# Patient Record
Sex: FEMALE | Race: WHITE
[De-identification: names, ages, dates, MRNs, and addresses within clinical notes are randomized per-mention and may not be internally consistent; named-entity substitution may affect disease eponyms.]

---

## 2020-01-09 ENCOUNTER — HOSPITAL ENCOUNTER (OUTPATIENT)
Dept: HOSPITAL 74 - JASU-SURG | Age: 35
Discharge: HOME | End: 2020-01-09
Attending: OBSTETRICS & GYNECOLOGY
Payer: COMMERCIAL

## 2020-01-09 VITALS — SYSTOLIC BLOOD PRESSURE: 102 MMHG | TEMPERATURE: 98.6 F | DIASTOLIC BLOOD PRESSURE: 67 MMHG

## 2020-01-09 VITALS — HEART RATE: 70 BPM

## 2020-01-09 VITALS — BODY MASS INDEX: 29.9 KG/M2

## 2020-01-09 DIAGNOSIS — O02.1: Primary | ICD-10-CM

## 2020-01-09 LAB
ALBUMIN SERPL-MCNC: 3.9 G/DL (ref 3.4–5)
ALP SERPL-CCNC: 47 U/L (ref 45–117)
ALT SERPL-CCNC: 31 U/L (ref 13–61)
ANION GAP SERPL CALC-SCNC: 8 MMOL/L (ref 8–16)
APPEARANCE UR: (no result)
AST SERPL-CCNC: 14 U/L (ref 15–37)
BACTERIA # UR AUTO: 38.7 /HPF
BASOPHILS # BLD: 0.6 % (ref 0–2)
BILIRUB SERPL-MCNC: 0.4 MG/DL (ref 0.2–1)
BILIRUB UR STRIP.AUTO-MCNC: NEGATIVE MG/DL
BUN SERPL-MCNC: 13.1 MG/DL (ref 7–18)
CALCIUM SERPL-MCNC: 8.8 MG/DL (ref 8.5–10.1)
CASTS URNS QL MICRO: 20 /LPF (ref 0–8)
CHLORIDE SERPL-SCNC: 107 MMOL/L (ref 98–107)
CO2 SERPL-SCNC: 25 MMOL/L (ref 21–32)
COLOR UR: YELLOW
CREAT SERPL-MCNC: 0.7 MG/DL (ref 0.55–1.3)
DEPRECATED RDW RBC AUTO: 12.7 % (ref 11.6–15.6)
EOSINOPHIL # BLD: 3.9 % (ref 0–4.5)
EPITH CASTS URNS QL MICRO: 6.8 /HPF
GLUCOSE SERPL-MCNC: 89 MG/DL (ref 74–106)
HCT VFR BLD CALC: 38 % (ref 32.4–45.2)
HGB BLD-MCNC: 13.2 GM/DL (ref 10.7–15.3)
KETONES UR QL STRIP: NEGATIVE
LEUKOCYTE ESTERASE UR QL STRIP.AUTO: (no result)
LYMPHOCYTES # BLD: 35.2 % (ref 8–40)
MCH RBC QN AUTO: 30.4 PG (ref 25.7–33.7)
MCHC RBC AUTO-ENTMCNC: 34.8 G/DL (ref 32–36)
MCV RBC: 87.3 FL (ref 80–96)
MONOCYTES # BLD AUTO: 9.8 % (ref 3.8–10.2)
NEUTROPHILS # BLD: 50.5 % (ref 42.8–82.8)
NITRITE UR QL STRIP: NEGATIVE
PH UR: 5 [PH] (ref 5–8)
PLATELET # BLD AUTO: 489 K/MM3 (ref 134–434)
PMV BLD: 7.6 FL (ref 7.5–11.1)
POTASSIUM SERPLBLD-SCNC: 4 MMOL/L (ref 3.5–5.1)
PROT SERPL-MCNC: 7.6 G/DL (ref 6.4–8.2)
PROT UR QL STRIP: NEGATIVE
PROT UR QL STRIP: NEGATIVE
RBC # BLD AUTO: 4 /HPF (ref 0–4)
RBC # BLD AUTO: 4.35 M/MM3 (ref 3.6–5.2)
SODIUM SERPL-SCNC: 140 MMOL/L (ref 136–145)
SP GR UR: 1.03 (ref 1.01–1.03)
UROBILINOGEN UR STRIP-MCNC: 0.2 MG/DL (ref 0.2–1)
WBC # BLD AUTO: 9.1 K/MM3 (ref 4–10)
WBC # UR AUTO: 36 /HPF (ref 0–5)

## 2020-01-09 PROCEDURE — 10D17ZZ EXTRACTION OF PRODUCTS OF CONCEPTION, RETAINED, VIA NATURAL OR ARTIFICIAL OPENING: ICD-10-PCS | Performed by: OBSTETRICS & GYNECOLOGY

## 2020-01-09 NOTE — OP
Operative Note





- Note:


Operative Date: 20


Pre-Operative Diagnosis: Missed 


Operation: Suction DC


Post-Operative Diagnosis: Same as Pre-op


Anesthesia: General


Estimated Blood Loss (mls): 30


Operative Report Dictated: Yes

## 2020-01-09 NOTE — HP
Satellite [facility]





- Chief Complaint


Chief Complaint: Missed 


History of Present Illness: 32 yo G P with usg showng missed  for DC


History Source: Patient


Limitations to Obtaining History: No Limitations





- Past Medical History


Allergies/Adverse Reactions: 


 Allergies











Allergy/AdvReac Type Severity Reaction Status Date / Time


 


No Known Allergies Allergy   Verified 20 08:51











...LMP: 10/18/19





- Current Medications


Current Medications: 


 Home Medications











 Medication  Instructions  Recorded


 


Amoxicillin - [Amoxicillin 500mg 500 mg PO BID 20





Capsule -]  














Satellite Physical Exam





- Physical Examination


Vital Signs: 


 Vital Signs











 Period  Temp  Pulse  Resp  BP Sys/Ramirez  Pulse Ox


 


 Last 24 Hr    96  16  124/78  99











General Appearance: Well Nourished, Well Developed


Lung: Clear to auscultation


Heart: Regular rate & rhythm


Breasts: Soft, Non-Tender


Abdomen: Soft


Extremities: No edema


Pelvic Exam: Within normal limits External Genitalia, Within normal limits 

Vagina, Within normal limits Cervix, Within normal limits [place], Other Uterus 

(enlarged)


Neurological: Intact, Alert, Oriented





Satellite Impression/Plan





- Impression/Plan


Impression: Missed 


Operative Procedure: Suction DC


Date to be Performed: 20

## 2020-01-09 NOTE — EKG
Test Reason : 

Blood Pressure : ***/*** mmHG

Vent. Rate : 084 BPM     Atrial Rate : 084 BPM

   P-R Int : 158 ms          QRS Dur : 070 ms

    QT Int : 342 ms       P-R-T Axes : 023 018 038 degrees

   QTc Int : 404 ms

 

NORMAL SINUS RHYTHM

NORMAL ECG

NO PREVIOUS ECGS AVAILABLE

Confirmed by ERNESTINA HARDY, GRACY ([id number]) on 1/9/2020 11:47:36 AM

 

Referred By: Radha Anne           Confirmed By:GRACY DO MD

## 2020-01-10 NOTE — PATH
Surgical Pathology Report



Patient Name:  RICKEY DEAN

Accession #:  

Med. Rec. #:  [id number]                                                        

   /Age/Gender:  1985 (Age: 34) / F

Account:  [id number]                                                          

             Location: [facility] SURGICAL

Taken:  2020

Received:  2020

Reported:  1/10/2020

Physicians:  Radha Anne M.D.

  



Specimen(s) Received

 PRODUCTS OF CONCEPTION FOR CHROMOSOMAL STUDY 





Clinical History

Missed 







Final Diagnosis

PRODUCTS OF CONCEPTION:

CHORIONIC VILLI PRESENT, CONSISTENT WITH PRODUCTS OF CONCEPTION.

CHROMOSOME ANALYSIS RESULT PENDING. AN ADDENDUM REPORT TO FOLLOW. 





***Electronically Signed***

Darnell Enriquez M.D.





Gross Description

Received fresh labeled "products of conception," is a 7.0 x 4.0 x 0.8 cm

aggregate of tan-red soft tissue fragments. Villous tissue is identified. No

definitive fetal somatic tissue is identified. A representative portion is

placed in RPMI solution and sent for chromosomal analysis. An additional

representative portion is submitted in one cassette.

/2020



saudi/[email or web address]

## 2020-01-16 NOTE — OP
DATE OF OPERATION:  2020

 

PREOPERATIVE DIAGNOSIS:  Missed .

 

OPERATION:  Suction dilation and curettage.

 

POSTOPERATIVE DIAGNOSIS:  Missed .

 

SURGEON:  Radha Anne MD

 

ESTIMATED BLOOD LOSS:  30 mL.

 

PROCEDURE:  Patient was taken to the operating room, placed in the dorsal lithotomy

position, prepped and draped in the usual sterile fashion.  Timeout was performed in

accordance with hospital regulation.  Speculum was placed in the vagina.  Anterior

lip of the cervix grasped with single-tooth tenaculum.  Cervix was then dilated to

accommodate the No. 8 suction curet.  Suction curettage was then performed followed

by sharp curettage.  All contents were emptied and submitted to Pathology for

chromosomal analysis.  All instruments were then removed.  Patient tolerated

procedure well.  Estimated blood loss was 30 mL.

 

 

RADHA ANNE M.D.

 

[id number]

DD: 2020 06:49

DT: 2020 11:20

Job #:  [id number]

## 2021-05-25 ENCOUNTER — HOSPITAL ENCOUNTER (INPATIENT)
Dept: HOSPITAL 74 - JDEL | Age: 36
LOS: 5 days | Discharge: HOME | DRG: 540 | End: 2021-05-30
Attending: OBSTETRICS & GYNECOLOGY | Admitting: OBSTETRICS & GYNECOLOGY
Payer: COMMERCIAL

## 2021-05-25 VITALS — BODY MASS INDEX: 35.9 KG/M2

## 2021-05-25 DIAGNOSIS — Z3A.38: ICD-10-CM

## 2021-05-25 DIAGNOSIS — O32.4XX0: ICD-10-CM

## 2021-05-25 DIAGNOSIS — O13.3: Primary | ICD-10-CM

## 2021-05-25 DIAGNOSIS — O36.63X0: ICD-10-CM

## 2021-05-25 DIAGNOSIS — E66.9: ICD-10-CM

## 2021-05-25 LAB
ALT SERPL-CCNC: 20 U/L (ref 13–61)
ANION GAP SERPL CALC-SCNC: 14 MMOL/L (ref 8–16)
APPEARANCE UR: (no result)
APTT BLD: 24.5 SECONDS (ref 25.2–36.5)
AST SERPL-CCNC: 15 U/L (ref 15–37)
BACTERIA # UR AUTO: 327 /UL (ref 0–1359)
BASOPHILS # BLD: 0.4 % (ref 0–2)
BILIRUB UR STRIP.AUTO-MCNC: NEGATIVE MG/DL
BUN SERPL-MCNC: 8.3 MG/DL (ref 7–18)
CALCIUM SERPL-MCNC: 8.9 MG/DL (ref 8.5–10.1)
CASTS URNS QL MICRO: 5 /UL (ref 0–3.1)
CHLORIDE SERPL-SCNC: 104 MMOL/L (ref 98–107)
CO2 SERPL-SCNC: 20 MMOL/L (ref 21–32)
COLOR UR: YELLOW
CREAT SERPL-MCNC: 0.5 MG/DL (ref 0.55–1.3)
DEPRECATED RDW RBC AUTO: 13.8 % (ref 11.6–15.6)
EOSINOPHIL # BLD: 1.1 % (ref 0–4.5)
EPITH CASTS URNS QL MICRO: >36 /UL (ref 0–25.1)
GGT SERPL-CCNC: 7 U/L (ref 5–85)
GLUCOSE SERPL-MCNC: 78 MG/DL (ref 74–106)
HCT VFR BLD CALC: 35 % (ref 32.4–45.2)
HGB BLD-MCNC: 12 GM/DL (ref 10.7–15.3)
INR BLD: 0.91 (ref 0.83–1.09)
KETONES UR QL STRIP: NEGATIVE
LEUKOCYTE ESTERASE UR QL STRIP.AUTO: (no result)
LYMPHOCYTES # BLD: 19.7 % (ref 8–40)
MCH RBC QN AUTO: 30.4 PG (ref 25.7–33.7)
MCHC RBC AUTO-ENTMCNC: 34.2 G/DL (ref 32–36)
MCV RBC: 88.7 FL (ref 80–96)
MONOCYTES # BLD AUTO: 8.5 % (ref 3.8–10.2)
NEUTROPHILS # BLD: 70.3 % (ref 42.8–82.8)
NITRITE UR QL STRIP: NEGATIVE
PH UR: 6.5 [PH] (ref 5–8)
PLATELET # BLD AUTO: 379 K/MM3 (ref 134–434)
PLATELET # BLD AUTO: 389 K/MM3 (ref 134–434)
PMV BLD: 8.6 FL (ref 7.5–11.1)
PROT UR QL STRIP: (no result)
PROT UR QL STRIP: NEGATIVE
PT PNL PPP: 11 SEC (ref 9.7–13)
RBC # BLD AUTO: 12 /UL (ref 0–23.9)
RBC # BLD AUTO: 3.95 M/MM3 (ref 3.6–5.2)
RETICS # AUTO: 2.61 % (ref 0.5–1.5)
SODIUM SERPL-SCNC: 137 MMOL/L (ref 136–145)
SP GR UR: 1.02 (ref 1.01–1.03)
URATE SERPL-SCNC: 4.2 MG/DL (ref 2.6–7.2)
UROBILINOGEN UR STRIP-MCNC: 0.2 MG/DL (ref 0.2–1)
WBC # BLD AUTO: 13.2 K/MM3 (ref 4–10)
WBC # UR AUTO: 51 /UL (ref 0–25.8)

## 2021-05-25 PROCEDURE — 3E0P7VZ INTRODUCTION OF HORMONE INTO FEMALE REPRODUCTIVE, VIA NATURAL OR ARTIFICIAL OPENING: ICD-10-PCS | Performed by: OBSTETRICS & GYNECOLOGY

## 2021-05-25 PROCEDURE — U0003 INFECTIOUS AGENT DETECTION BY NUCLEIC ACID (DNA OR RNA); SEVERE ACUTE RESPIRATORY SYNDROME CORONAVIRUS 2 (SARS-COV-2) (CORONAVIRUS DISEASE [COVID-19]), AMPLIFIED PROBE TECHNIQUE, MAKING USE OF HIGH THROUGHPUT TECHNOLOGIES AS DESCRIBED BY CMS-2020-01-R: HCPCS

## 2021-05-25 PROCEDURE — U0005 INFEC AGEN DETEC AMPLI PROBE: HCPCS

## 2021-05-25 PROCEDURE — C9803 HOPD COVID-19 SPEC COLLECT: HCPCS

## 2021-05-25 RX ADMIN — LABETALOL HYDROCHLORIDE SCH MG: 200 TABLET, FILM COATED ORAL at 14:00

## 2021-05-25 RX ADMIN — LABETALOL HYDROCHLORIDE SCH MG: 200 TABLET, FILM COATED ORAL at 22:00

## 2021-05-26 LAB
BASE EXCESS BLDCOA CALC-SCNC: -4.9 MMOL/L (ref 0–2)
HCO3 BLDCO-SCNC: 22.2 MMHG (ref 20–29)
PCO2 BLDCO: 47.6 MMHG (ref 30–78)
PH BLDCO: 7.29 [PH] (ref 7.14–7.44)

## 2021-05-26 PROCEDURE — 3E033VJ INTRODUCTION OF OTHER HORMONE INTO PERIPHERAL VEIN, PERCUTANEOUS APPROACH: ICD-10-PCS | Performed by: OBSTETRICS & GYNECOLOGY

## 2021-05-26 RX ADMIN — LABETALOL HYDROCHLORIDE SCH MG: 200 TABLET, FILM COATED ORAL at 21:52

## 2021-05-26 RX ADMIN — LABETALOL HYDROCHLORIDE SCH: 200 TABLET, FILM COATED ORAL at 14:44

## 2021-05-26 RX ADMIN — LABETALOL HYDROCHLORIDE SCH MG: 200 TABLET, FILM COATED ORAL at 06:00

## 2021-05-26 RX ADMIN — BUPIVACAINE HYDROCHLORIDE SCH: 7.5 INJECTION, SOLUTION EPIDURAL; RETROBULBAR at 17:41

## 2021-05-26 RX ADMIN — BUPIVACAINE HYDROCHLORIDE SCH: 7.5 INJECTION, SOLUTION EPIDURAL; RETROBULBAR at 12:37

## 2021-05-27 LAB
DEPRECATED RDW RBC AUTO: 14.2 % (ref 11.6–15.6)
HCT VFR BLD CALC: 29 % (ref 32.4–45.2)
HGB BLD-MCNC: 10 GM/DL (ref 10.7–15.3)
MCH RBC QN AUTO: 30.7 PG (ref 25.7–33.7)
MCHC RBC AUTO-ENTMCNC: 34.5 G/DL (ref 32–36)
MCV RBC: 88.8 FL (ref 80–96)
PLATELET # BLD AUTO: 299 K/MM3 (ref 134–434)
PMV BLD: 8.3 FL (ref 7.5–11.1)
RBC # BLD AUTO: 3.27 M/MM3 (ref 3.6–5.2)
WBC # BLD AUTO: 16.5 K/MM3 (ref 4–10)

## 2021-05-27 RX ADMIN — LABETALOL HYDROCHLORIDE SCH MG: 200 TABLET, FILM COATED ORAL at 23:00

## 2021-05-27 RX ADMIN — IBUPROFEN PRN MG: 800 INJECTION INTRAVENOUS at 17:30

## 2021-05-27 RX ADMIN — SIMETHICONE CHEW TAB 80 MG PRN MG: 80 TABLET ORAL at 23:01

## 2021-05-27 RX ADMIN — LABETALOL HYDROCHLORIDE SCH MG: 200 TABLET, FILM COATED ORAL at 06:03

## 2021-05-27 RX ADMIN — LABETALOL HYDROCHLORIDE SCH MG: 200 TABLET, FILM COATED ORAL at 15:01

## 2021-05-27 RX ADMIN — IBUPROFEN PRN MG: 800 INJECTION INTRAVENOUS at 06:03

## 2021-05-27 RX ADMIN — ACETAMINOPHEN PRN MG: 325 TABLET ORAL at 23:00

## 2021-05-28 RX ADMIN — LABETALOL HYDROCHLORIDE SCH MG: 200 TABLET, FILM COATED ORAL at 22:26

## 2021-05-28 RX ADMIN — IBUPROFEN PRN MG: 600 TABLET, FILM COATED ORAL at 22:26

## 2021-05-28 RX ADMIN — SIMETHICONE CHEW TAB 80 MG PRN MG: 80 TABLET ORAL at 06:05

## 2021-05-28 RX ADMIN — IBUPROFEN PRN MG: 600 TABLET, FILM COATED ORAL at 06:04

## 2021-05-28 RX ADMIN — ACETAMINOPHEN PRN MG: 325 TABLET ORAL at 06:04

## 2021-05-28 RX ADMIN — SIMETHICONE CHEW TAB 80 MG PRN MG: 80 TABLET ORAL at 22:27

## 2021-05-28 RX ADMIN — LABETALOL HYDROCHLORIDE SCH MG: 200 TABLET, FILM COATED ORAL at 06:04

## 2021-05-28 RX ADMIN — ACETAMINOPHEN PRN MG: 325 TABLET ORAL at 22:26

## 2021-05-28 RX ADMIN — LABETALOL HYDROCHLORIDE SCH MG: 200 TABLET, FILM COATED ORAL at 14:17

## 2021-05-29 LAB
DEPRECATED RDW RBC AUTO: 14 % (ref 11.6–15.6)
HCT VFR BLD CALC: 27.3 % (ref 32.4–45.2)
HGB BLD-MCNC: 9.6 GM/DL (ref 10.7–15.3)
MCH RBC QN AUTO: 31.5 PG (ref 25.7–33.7)
MCHC RBC AUTO-ENTMCNC: 35.2 G/DL (ref 32–36)
MCV RBC: 89.4 FL (ref 80–96)
PLATELET # BLD AUTO: 355 K/MM3 (ref 134–434)
PMV BLD: 7.8 FL (ref 7.5–11.1)
RBC # BLD AUTO: 3.05 M/MM3 (ref 3.6–5.2)
WBC # BLD AUTO: 10.3 K/MM3 (ref 4–10)

## 2021-05-29 RX ADMIN — LABETALOL HYDROCHLORIDE SCH MG: 200 TABLET, FILM COATED ORAL at 06:32

## 2021-05-29 RX ADMIN — ACETAMINOPHEN PRN MG: 325 TABLET ORAL at 06:32

## 2021-05-29 RX ADMIN — LABETALOL HYDROCHLORIDE SCH MG: 200 TABLET, FILM COATED ORAL at 22:12

## 2021-05-29 RX ADMIN — SIMETHICONE CHEW TAB 80 MG PRN MG: 80 TABLET ORAL at 06:33

## 2021-05-29 RX ADMIN — LABETALOL HYDROCHLORIDE SCH MG: 200 TABLET, FILM COATED ORAL at 14:15

## 2021-05-29 RX ADMIN — ACETAMINOPHEN PRN MG: 325 TABLET ORAL at 16:00

## 2021-05-30 VITALS — DIASTOLIC BLOOD PRESSURE: 90 MMHG | TEMPERATURE: 98.2 F | SYSTOLIC BLOOD PRESSURE: 152 MMHG | HEART RATE: 80 BPM

## 2021-05-30 RX ADMIN — LABETALOL HYDROCHLORIDE SCH MG: 200 TABLET, FILM COATED ORAL at 06:42

## 2021-05-30 RX ADMIN — SIMETHICONE CHEW TAB 80 MG PRN MG: 80 TABLET ORAL at 02:14

## 2021-05-30 RX ADMIN — ACETAMINOPHEN PRN MG: 325 TABLET ORAL at 02:16
